# Patient Record
Sex: MALE | URBAN - METROPOLITAN AREA
[De-identification: names, ages, dates, MRNs, and addresses within clinical notes are randomized per-mention and may not be internally consistent; named-entity substitution may affect disease eponyms.]

---

## 2024-10-11 ENCOUNTER — OFFICE VISIT (OUTPATIENT)
Dept: URGENT CARE | Age: 24
End: 2024-10-11
Payer: COMMERCIAL

## 2024-10-11 VITALS
HEART RATE: 126 BPM | HEIGHT: 72 IN | DIASTOLIC BLOOD PRESSURE: 73 MMHG | BODY MASS INDEX: 40.23 KG/M2 | TEMPERATURE: 100.4 F | OXYGEN SATURATION: 98 % | RESPIRATION RATE: 18 BRPM | SYSTOLIC BLOOD PRESSURE: 139 MMHG | WEIGHT: 297 LBS

## 2024-10-11 DIAGNOSIS — U07.1 ACUTE COVID-19: ICD-10-CM

## 2024-10-11 LAB
POC RAPID INFLUENZA A: NEGATIVE
POC RAPID INFLUENZA B: NEGATIVE
POC SARS-COV-2 AG BINAX: ABNORMAL

## 2024-10-11 RX ORDER — PREDNISONE 20 MG/1
40 TABLET ORAL DAILY
Qty: 10 TABLET | Refills: 0 | Status: SHIPPED | OUTPATIENT
Start: 2024-10-11 | End: 2024-10-16

## 2024-10-11 ASSESSMENT — ENCOUNTER SYMPTOMS
SINUS PAIN: 1
CARDIOVASCULAR NEGATIVE: 1
SORE THROAT: 1
EYES NEGATIVE: 1
CHILLS: 1
FATIGUE: 1
FEVER: 1
COUGH: 1
GASTROINTESTINAL NEGATIVE: 1
RHINORRHEA: 1

## 2024-10-11 NOTE — PROGRESS NOTES
Subjective   Patient ID: William Frances is a 24 y.o. male. They present today with a chief complaint of Cough (Productive, symptoms increasing x 5 days), Fever, Headache, and Sinus Problem (Nasal congestion).    History of Present Illness  Upper Respiratory Infection: Patient complains of symptoms of a URI. Symptoms include congestion and fever. Onset of symptoms was 4 days ago, gradually worsening since that time. He also c/o achiness, congestion, and headache for the past 2 days .  He is not drinking much. Evaluation to date: none. Treatment to date: none.           History provided by:  Patient      Past Medical History  Allergies as of 10/11/2024 - Reviewed 10/11/2024   Allergen Reaction Noted    Penicillins Unknown 10/11/2024       (Not in a hospital admission)       No past medical history on file.    No past surgical history on file.     reports that he has never smoked. He uses smokeless tobacco.    Review of Systems  Review of Systems   Constitutional:  Positive for chills, fatigue and fever.   HENT:  Positive for congestion, ear pain, rhinorrhea, sinus pain and sore throat.    Eyes: Negative.    Respiratory:  Positive for cough.    Cardiovascular: Negative.    Gastrointestinal: Negative.    All other systems reviewed and are negative.                                 Objective    Vitals:    10/11/24 1248   BP: 139/73   BP Location: Right arm   Patient Position: Sitting   BP Cuff Size: Large adult   Pulse: (!) 126   Resp: 18   Temp: (!) 38 °C (100.4 °F)   TempSrc: Oral   SpO2: 98%   Weight: 135 kg (297 lb)   Height: 1.829 m (6')     No LMP for male patient.    Physical Exam  Vitals and nursing note reviewed.   Constitutional:       General: He is not in acute distress.     Appearance: Normal appearance. He is ill-appearing.   HENT:      Head: Normocephalic and atraumatic.      Right Ear: A middle ear effusion is present.      Left Ear: A middle ear effusion is present.      Nose: Mucosal edema, congestion and  rhinorrhea present. Rhinorrhea is clear.      Right Turbinates: Swollen.      Left Turbinates: Swollen.      Right Sinus: Frontal sinus tenderness present.      Left Sinus: Frontal sinus tenderness present.      Mouth/Throat:      Mouth: Mucous membranes are moist.      Pharynx: Uvula midline. Posterior oropharyngeal erythema and postnasal drip present. No oropharyngeal exudate.      Tonsils: No tonsillar exudate.   Eyes:      Extraocular Movements: Extraocular movements intact.      Conjunctiva/sclera: Conjunctivae normal.      Pupils: Pupils are equal, round, and reactive to light.   Cardiovascular:      Rate and Rhythm: Regular rhythm. Tachycardia present.      Pulses: Normal pulses.      Heart sounds: Normal heart sounds, S1 normal and S2 normal.   Pulmonary:      Effort: Pulmonary effort is normal. No respiratory distress.      Breath sounds: Normal breath sounds. No stridor. No wheezing, rhonchi or rales.   Abdominal:      General: Bowel sounds are normal.   Musculoskeletal:      Cervical back: Normal range of motion.   Lymphadenopathy:      Cervical: No cervical adenopathy.   Skin:     General: Skin is warm and moist.      Capillary Refill: Capillary refill takes less than 2 seconds.   Neurological:      Mental Status: He is alert and oriented to person, place, and time.   Psychiatric:         Mood and Affect: Mood normal.         Behavior: Behavior normal.         Thought Content: Thought content normal.         Judgment: Judgment normal.         Procedures    Point of Care Test & Imaging Results from this visit  Results for orders placed or performed in visit on 10/11/24   POCT Covid-19 Rapid Antigen   Result Value Ref Range    POC CLAUDIA-COV-2 AG Positive test for SARS-CoV-2 (antigen detected) (A) Presumptive negative test for SARS-CoV-2 (no antigen detected)   POCT Influenza A/B manually resulted   Result Value Ref Range    POC Rapid Influenza A Negative Negative    POC Rapid Influenza B Negative Negative       No results found.    Diagnostic study results (if any) were reviewed by DAINA Butler.    Assessment/Plan   Allergies, medications, history, and pertinent labs/EKGs/Imaging reviewed by DAINA Butler.     Medical Decision Making  Risks, benefits, and alternatives of the medications and treatment plan prescribed today were discussed, and patient expressed understanding. Plan follow up as discussed or as needed if any worsening symptoms or change in condition. Reinforced red flags including (but not limited to): severe or worsening pain; difficulty swallowing; stiff neck; shortness of breath; coughing or vomiting blood; chest pain; and new or increased fever are indications to go to the Emergency Department.  At time of discharge patient was clinically well-appearing and HDS for outpatient management. The patient and/or family was educated regarding diagnosis, supportive care, OTC and Rx medications. The patient and/or family was given the opportunity to ask questions prior to discharge.  They verbalized understanding of my discussion of the plans for treatment, expected course, indications to return to  or seek further evaluation in ED, and the need for timely follow up as directed.   They were provided with a work/school excuse if requested. The after-visit summary was given to the patient and care instructions were reviewed with the patient. All questions were answered and the patient verbalized understanding of the plan of care for today.      Orders and Diagnoses  Diagnoses and all orders for this visit:  Acute COVID-19  -     POCT Covid-19 Rapid Antigen  -     POCT Influenza A/B manually resulted  -     predniSONE (Deltasone) 20 mg tablet; Take 2 tablets (40 mg) by mouth once daily for 5 days.      Medical Admin Record      Patient disposition: Home    Electronically signed by DAINA Butler  1:14 PM

## 2024-10-11 NOTE — LETTER
October 11, 2024     Patient: William Frances   YOB: 2000   Date of Visit: 10/11/2024       To Whom It May Concern:    William Frances was seen in my clinic on 10/11/2024 at 12:30 pm. Please excuse William for his absence from school on this day to make the appointment. To return to school on 10/15/2024.    If you have any questions or concerns, please don't hesitate to call.         Sincerely,         KAROL Butler-CNP        CC: No Recipients

## 2024-10-11 NOTE — PATIENT INSTRUCTIONS
Take all medications as ordered.  Increase fluid intake and rest.  Return with any new or worsening symptoms. Follow up with PCP as needed.   Proceed to the nearest ER with any chest pain, shortness of breath, difficulty swallowing or breathing, worsening pain.

## 2025-09-05 ENCOUNTER — TELEPHONE (OUTPATIENT)
Dept: BEHAVIORAL HEALTH | Facility: CLINIC | Age: 25
End: 2025-09-05

## 2025-09-08 ENCOUNTER — APPOINTMENT (OUTPATIENT)
Dept: BEHAVIORAL HEALTH | Facility: CLINIC | Age: 25
End: 2025-09-08